# Patient Record
Sex: FEMALE | Race: WHITE
[De-identification: names, ages, dates, MRNs, and addresses within clinical notes are randomized per-mention and may not be internally consistent; named-entity substitution may affect disease eponyms.]

---

## 2020-04-26 ENCOUNTER — MESSAGE (OUTPATIENT)
Age: 25
End: 2020-04-26

## 2020-05-03 ENCOUNTER — APPOINTMENT (OUTPATIENT)
Dept: DISASTER EMERGENCY | Facility: HOSPITAL | Age: 25
End: 2020-05-03

## 2020-05-04 LAB
SARS-COV-2 IGG SERPL IA-ACNC: <0.1 INDEX
SARS-COV-2 IGG SERPL QL IA: NEGATIVE

## 2022-12-06 ENCOUNTER — APPOINTMENT (OUTPATIENT)
Dept: OTOLARYNGOLOGY | Facility: CLINIC | Age: 27
End: 2022-12-06

## 2022-12-06 VITALS — BODY MASS INDEX: 22.15 KG/M2 | WEIGHT: 125 LBS | HEIGHT: 63 IN

## 2022-12-06 DIAGNOSIS — Z87.09 PERSONAL HISTORY OF OTHER DISEASES OF THE RESPIRATORY SYSTEM: ICD-10-CM

## 2022-12-06 PROBLEM — Z00.00 ENCOUNTER FOR PREVENTIVE HEALTH EXAMINATION: Status: ACTIVE | Noted: 2022-12-06

## 2022-12-06 PROCEDURE — 99203 OFFICE O/P NEW LOW 30 MIN: CPT | Mod: 25

## 2022-12-06 PROCEDURE — 31575 DIAGNOSTIC LARYNGOSCOPY: CPT

## 2022-12-06 RX ORDER — PANTOPRAZOLE 40 MG/1
40 TABLET, DELAYED RELEASE ORAL
Qty: 30 | Refills: 3 | Status: ACTIVE | COMMUNITY
Start: 2022-12-06 | End: 1900-01-01

## 2022-12-06 NOTE — HISTORY OF PRESENT ILLNESS
[de-identified] : Patient presents today c/o recurring tonsillitis .  Patient has a history of tonsillitis as a child and admits she gets tonsillitis\par lately every month .  Her tonsils get so swollen she has a hard time swallowing liquids and solids.  SHe has recurring pain and difficulty swallowing. Pt has had several episodes this year. Pt has been strep negative. Pt has been on abx and steroids in the past.

## 2022-12-06 NOTE — PHYSICAL EXAM
[de-identified] : left lymphadenopathy  [Normal] : mucosa is normal [Midline] : trachea located in midline position [de-identified] : edema

## 2022-12-16 ENCOUNTER — APPOINTMENT (OUTPATIENT)
Dept: OTOLARYNGOLOGY | Facility: CLINIC | Age: 27
End: 2022-12-16

## 2022-12-16 DIAGNOSIS — J03.91 ACUTE RECURRENT TONSILLITIS, UNSPECIFIED: ICD-10-CM

## 2022-12-16 DIAGNOSIS — R59.0 LOCALIZED ENLARGED LYMPH NODES: ICD-10-CM

## 2022-12-16 PROCEDURE — 31575 DIAGNOSTIC LARYNGOSCOPY: CPT

## 2022-12-16 PROCEDURE — 99213 OFFICE O/P EST LOW 20 MIN: CPT | Mod: 25

## 2022-12-16 NOTE — HISTORY OF PRESENT ILLNESS
[FreeTextEntry1] : Patient following up on recurrent tonsillitis. Patient admits having sore throat again. Patient has been taking Pantoprazole. She has these episodes for a few days to weeks every one or two months. Also complains of lymphadenopathies concomittantly.

## 2022-12-16 NOTE — ASSESSMENT
[FreeTextEntry1] : Recurrent URI.\par \par Part of history and discussion with patient's mother.\par \par I discussed [place] criteria and advised the patient that removing her tonsils is unlikely to resolve her recurrent viral infections.

## 2022-12-19 LAB — BACTERIA THROAT CULT: NORMAL

## 2023-03-15 ENCOUNTER — APPOINTMENT (OUTPATIENT)
Dept: OTOLARYNGOLOGY | Facility: CLINIC | Age: 28
End: 2023-03-15

## 2024-01-09 ENCOUNTER — APPOINTMENT (OUTPATIENT)
Dept: OTOLARYNGOLOGY | Facility: CLINIC | Age: 29
End: 2024-01-09

## 2024-04-17 ENCOUNTER — APPOINTMENT (OUTPATIENT)
Dept: ORTHOPEDIC SURGERY | Facility: CLINIC | Age: 29
End: 2024-04-17
Payer: COMMERCIAL

## 2024-04-17 VITALS — WEIGHT: 120 LBS | HEIGHT: 64 IN | BODY MASS INDEX: 20.49 KG/M2

## 2024-04-17 DIAGNOSIS — Z78.9 OTHER SPECIFIED HEALTH STATUS: ICD-10-CM

## 2024-04-17 PROCEDURE — 73110 X-RAY EXAM OF WRIST: CPT | Mod: LT

## 2024-04-17 PROCEDURE — 73140 X-RAY EXAM OF FINGER(S): CPT | Mod: LT

## 2024-04-17 PROCEDURE — 99203 OFFICE O/P NEW LOW 30 MIN: CPT

## 2024-04-17 NOTE — IMAGING
[de-identified] : L wrist Mild over the first dorsal compartment and distal radius swelling Tender first dorsal comp Decreased thumb and wrist ROM +Finklesteins No instability of the thumb appreciated.  No laxity appreciated. Sensorimotor intact distally.  Neuro vas intact.  X-rays left wrist and thumb taken in the office today:  No acute fractures, subluxations, or dislocations.

## 2024-04-17 NOTE — DISCUSSION/SUMMARY
[de-identified] : My clinical suspicion is high for a Dequervain's tenosynovitis given the patient's history, physical examination findings, and x-ray findings.  I gave the patient a left thumb spica brace in the office today that was utilized at all times especially when active.  Encouraged very gentle range of motion activity modification.  The nature of the diagnosis was discussed with the patient.  X-rays are negative.  I recommended anti-inflammatory medication.  Meloxicam sent to patient's pharmacy to be taken as needed for pain.  Confirmed no contraindication to NSAIDs.  Risks and benefits discussed.  I recommended patient rest, ice, compress, and elevate the left wrist regularly. Encouraged activity modification as tolerable. Encouraged gentle range of motion to avoid stiffness.  All questions and concerns addressed to patient's satisfaction. Patient expresses full understanding of treatment plan. I will give the patient follow-up with Dr. Burnett in 1 month for repeat evaluation and treatment.  If patient's symptoms do not improve we will consider cortisone injections and repeat evaluation.

## 2024-04-17 NOTE — HISTORY OF PRESENT ILLNESS
[de-identified] : 28-year-old female for evaluation of left wrist pain.  Patient reports pain in the first dorsal compartment of her left wrist and over the left distal radius.  She denies any trauma or falls.  Denies any numbness or tingling.  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

## 2024-04-26 ENCOUNTER — APPOINTMENT (OUTPATIENT)
Dept: ORTHOPEDIC SURGERY | Facility: CLINIC | Age: 29
End: 2024-04-26
Payer: COMMERCIAL

## 2024-04-26 PROCEDURE — 20550 NJX 1 TENDON SHEATH/LIGAMENT: CPT | Mod: LT

## 2024-04-26 PROCEDURE — 99202 OFFICE O/P NEW SF 15 MIN: CPT | Mod: 25

## 2024-04-26 NOTE — HISTORY OF PRESENT ILLNESS
[de-identified] : 28-year-old female left-sided wrist pain discomfort.  Comes in today for evaluation.  She has been wearing a brace taking Mobic has had no relief.  She comes in

## 2024-04-26 NOTE — ASSESSMENT
[FreeTextEntry1] : Patient has left-sided dequervains tendinitis.  She receives her first cortisone injection today.  She tolerated well.  Will see how the injection does.  Hopefully help to relieve her symptoms.

## 2024-04-26 NOTE — PHYSICAL EXAM
[de-identified] : Patient has tenderness to palpation on the first dorsal compartment.  Positive Finkelstein's test.  Normal sensation normal cap refill.  No erythema ecchymoses or abrasions.

## 2024-04-26 NOTE — PROCEDURE
[FreeTextEntry3] : de Quervains injection was performed because of pain inflammation and stiffness Anesthesia: ethyl chloride sprayed topically Dexamethsone:  1 cc of 4mg/ml Lidocaine: An injection of Lidocaine 1% 1cc  Patient has tried OTC's including aspirin, Ibuprofen, Aleve etc or prescription NSAIDS, and/or exercises at home and/ or physical therapy without satisfactory response. After verbal consent using sterile preparation and technique. The risks, benefits, and alternatives to cortisone injection were explained in full to the patient. Risks outlined include but are not limited to infection, sepsis, bleeding, scarring, skin discoloration, temporary increase in pain, syncopal episode, failure to resolve symptoms, allergic reaction, symptom recurrence, and elevation of blood sugar in diabetics. Patient understood the risks. All questions were answered. After discussion of options, patient requested an injection. Oral informed consent was obtained and sterile prep was done of the injection site. Sterile technique was utilized for the procedure including the preparation of the solutions used for the injection. Patient tolerated the procedure well. Advised to ice the injection site this evening. Prep with Etoh locally to site. Sterile technique used tendon sheath of first compartment was injected  left first compartment wrist

## 2024-05-14 ENCOUNTER — RX RENEWAL (OUTPATIENT)
Age: 29
End: 2024-05-14

## 2024-05-14 RX ORDER — MELOXICAM 15 MG/1
15 TABLET ORAL
Qty: 30 | Refills: 0 | Status: ACTIVE | COMMUNITY
Start: 2024-04-18 | End: 1900-01-01

## 2024-06-03 ENCOUNTER — APPOINTMENT (OUTPATIENT)
Dept: ORTHOPEDIC SURGERY | Facility: CLINIC | Age: 29
End: 2024-06-03
Payer: COMMERCIAL

## 2024-06-03 DIAGNOSIS — M65.4 RADIAL STYLOID TENOSYNOVITIS [DE QUERVAIN]: ICD-10-CM

## 2024-06-03 PROCEDURE — 20550 NJX 1 TENDON SHEATH/LIGAMENT: CPT | Mod: LT

## 2024-06-03 PROCEDURE — 99213 OFFICE O/P EST LOW 20 MIN: CPT | Mod: 25

## 2024-06-03 NOTE — HISTORY OF PRESENT ILLNESS
[de-identified] : 28-year-old female pain discomfort in her left wrist.  Did not get significantly better from her initial cortisone injection.  Somewhat better for a couple of days then the pain came back and she comes in for the evaluation today.

## 2024-06-03 NOTE — PHYSICAL EXAM
[de-identified] : Patient has tenderness to palpation along the first dorsal compartment.  Positive Finkelstein's test is noted as well.  Normal sensation normal cap refill.  No erythema ecchymosis or abrasions.

## 2024-06-03 NOTE — PROCEDURE
[FreeTextEntry3] : de Quervains injection was performed because of pain inflammation and stiffness Anesthesia: ethyl chloride sprayed topically Dexamethsone:  1 cc of 4mg/ml Lidocaine: An injection of Lidocaine 1% 1cc  Patient has tried OTC's including aspirin, Ibuprofen, Aleve etc or prescription NSAIDS, and/or exercises at home and/ or physical therapy without satisfactory response. After verbal consent using sterile preparation and technique. The risks, benefits, and alternatives to cortisone injection were explained in full to the patient. Risks outlined include but are not limited to infection, sepsis, bleeding, scarring, skin discoloration, temporary increase in pain, syncopal episode, failure to resolve symptoms, allergic reaction, symptom recurrence, and elevation of blood sugar in diabetics. Patient understood the risks. All questions were answered. After discussion of options, patient requested an injection. Oral informed consent was obtained and sterile prep was done of the injection site. Sterile technique was utilized for the procedure including the preparation of the solutions used for the injection. Patient tolerated the procedure well. Advised to ice the injection site this evening. Prep with Etoh locally to site. Sterile technique used tendon sheath of first compartment was injected Left wrist first compartment

## 2024-06-03 NOTE — ASSESSMENT
[FreeTextEntry1] : Patient has left-sided dequervains tendinitis.  Patient received a second cortisone injection today.  She tolerated well.  Will see how the injection does.  Hopefully help to relieve her symptoms.  If it does not the potential for surgical intervention for release was also discussed.

## 2024-07-23 ENCOUNTER — APPOINTMENT (OUTPATIENT)
Dept: ORTHOPEDIC SURGERY | Facility: HOSPITAL | Age: 29
End: 2024-07-23

## 2024-07-23 ENCOUNTER — OUTPATIENT (OUTPATIENT)
Dept: OUTPATIENT SERVICES | Facility: HOSPITAL | Age: 29
LOS: 1 days | Discharge: HOME | End: 2024-07-23
Payer: COMMERCIAL

## 2024-07-23 ENCOUNTER — TRANSCRIPTION ENCOUNTER (OUTPATIENT)
Age: 29
End: 2024-07-23

## 2024-07-23 VITALS
TEMPERATURE: 98 F | HEART RATE: 81 BPM | OXYGEN SATURATION: 100 % | SYSTOLIC BLOOD PRESSURE: 116 MMHG | DIASTOLIC BLOOD PRESSURE: 80 MMHG | RESPIRATION RATE: 18 BRPM | HEIGHT: 63 IN | WEIGHT: 125 LBS

## 2024-07-23 VITALS — RESPIRATION RATE: 16 BRPM | SYSTOLIC BLOOD PRESSURE: 103 MMHG | HEART RATE: 72 BPM | DIASTOLIC BLOOD PRESSURE: 67 MMHG

## 2024-07-23 DIAGNOSIS — M65.4 RADIAL STYLOID TENOSYNOVITIS [DE QUERVAIN]: ICD-10-CM

## 2024-07-23 PROCEDURE — 25000 INCISION OF TENDON SHEATH: CPT | Mod: LT

## 2024-07-23 RX ORDER — HYDROMORPHONE HCL 0.2 MG/ML
0.5 INJECTION, SOLUTION INTRAVENOUS
Refills: 0 | Status: DISCONTINUED | OUTPATIENT
Start: 2024-07-23 | End: 2024-07-23

## 2024-07-23 RX ORDER — ONDANSETRON HYDROCHLORIDE 2 MG/ML
4 INJECTION INTRAMUSCULAR; INTRAVENOUS ONCE
Refills: 0 | Status: DISCONTINUED | OUTPATIENT
Start: 2024-07-23 | End: 2024-07-23

## 2024-07-23 RX ORDER — DEXTROSE MONOHYDRATE AND SODIUM CHLORIDE 5; .3 G/100ML; G/100ML
1000 INJECTION, SOLUTION INTRAVENOUS
Refills: 0 | Status: DISCONTINUED | OUTPATIENT
Start: 2024-07-23 | End: 2024-07-23

## 2024-07-23 RX ORDER — NORETHINDRONE-ETHIN. ESTRADIOL 1 MG-35MCG
1 TABLET ORAL
Refills: 0 | DISCHARGE

## 2024-07-23 NOTE — ASU DISCHARGE PLAN (ADULT/PEDIATRIC) - NS MD DC FALL RISK RISK
For information on Fall & Injury Prevention, visit: [email or web address] OR  [email or web address] OR  https://www.cdc.gov/steadi/patient.html

## 2024-07-23 NOTE — ASU PATIENT PROFILE, ADULT - ANESTHESIA, PREVIOUS REACTION, PROFILE
Dr. Thomas's , Jorge [phone], called about moving patient appointment up as you requested.  However, nurse reviewed your note and states she has an anxiety issue and needs to see PCP.  They are wanting to know why she needs to be bumped up sooner or you can call Dr. Thomas and discuss her issue.  Please advise.     none

## 2024-07-29 DIAGNOSIS — M65.4 RADIAL STYLOID TENOSYNOVITIS [DE QUERVAIN]: ICD-10-CM

## 2024-07-29 DIAGNOSIS — Z88.1 ALLERGY STATUS TO OTHER ANTIBIOTIC AGENTS: ICD-10-CM

## 2024-07-29 DIAGNOSIS — Z88.0 ALLERGY STATUS TO PENICILLIN: ICD-10-CM

## 2024-08-01 ENCOUNTER — APPOINTMENT (OUTPATIENT)
Dept: ORTHOPEDIC SURGERY | Facility: CLINIC | Age: 29
End: 2024-08-01
Payer: COMMERCIAL

## 2024-08-01 ENCOUNTER — NON-APPOINTMENT (OUTPATIENT)
Age: 29
End: 2024-08-01

## 2024-08-01 DIAGNOSIS — M65.4 RADIAL STYLOID TENOSYNOVITIS [DE QUERVAIN]: ICD-10-CM

## 2024-08-01 PROCEDURE — 99024 POSTOP FOLLOW-UP VISIT: CPT

## 2024-08-01 NOTE — PHYSICAL EXAM
[de-identified] : Physical exam of her left wrist: Resolving swelling and ecchymosis. The wound is clean and dry. No signs of drainage, pus, or infection.  Good range of motion of the fingers. Sensory and motor are intact.

## 2024-08-01 NOTE — DISCUSSION/SUMMARY
[de-identified] : Sutures were removed. She was instructed to work on scar massage and range of motion. Healing can take 4-6 weeks. She will follow-up prn. All questions were answered today.

## 2024-08-01 NOTE — HISTORY OF PRESENT ILLNESS
[de-identified] : Patient is a 28 year F here for her first PO appt. She is status post a left dequervein's release done by Dr. Bush. She is doing well.